# Patient Record
(demographics unavailable — no encounter records)

---

## 2025-05-22 NOTE — PLAN
[FreeTextEntry1] : Routine Gyn Exam: BSE taught Pap/HPV conducted Rx given for BR MRI--no hx of breast cancer--told can possibly do every 2 yrs. Rx TVS due to hx of BRCA 1 Rx DEXA -baseline done today and nl--Ca/Vit D 1000mcg, nutrition, 30 min weight bearing exercise discussed with patient Rx. Abd sono and explained limitation of sono for pancreas and best with MRCP or EUS but not clear studies about screening for pancreatic cancer.  Rx estradiol patch 0.05 twice a week for premature menopause due to BSO.  RTO in 1 year or PRN

## 2025-05-22 NOTE — HISTORY OF PRESENT ILLNESS
[FreeTextEntry1] :  JENNI VILCHIS, 43 year old female  BRCA 1 positive s/p b/l mastectomy and lap JESSE/BSO prophylactically  presents for annual visit.  Pt denies vaginal bleeding. She denies abn discharge or vaginitis sxs. No urinary complaints. She has normal BM, no bloody stool. She denies abdominal or pelvic pain.  Denies changes in medical status, medications, serious illness, hospitalizations, and surgeries.  PMHx: BRCA 1 pos  SHx: B/l mastectomy, lap JESSE w/ b/l salpingo-oophorectomy, breast implants  FMHx: Paternal cousin, sister- breast ca, BRCA  pos, sister BRCA pos and had a lumpectomy; Father(): Lung ca. Denies Fam hx of ovarian, uterine, and prostate ca.  All: NKDA  Med: HRT- estrogen patches weekly   Preventative Visit: Breast MRI (2024) : scarring w/ minimal low-level enhancement.   PAP Smear: NILM: HPV not detected., 2024 Abd Sono: no obvious pancreatic lesion., nl sono  PHQ: 0

## 2025-05-22 NOTE — PHYSICAL EXAM
[Appropriately responsive] : appropriately responsive [Alert] : alert [No Acute Distress] : no acute distress [No Lymphadenopathy] : no lymphadenopathy [Regular Rate Rhythm] : regular rate rhythm [No Murmurs] : no murmurs [Clear to Auscultation B/L] : clear to auscultation bilaterally [Soft] : soft [Non-tender] : non-tender [Non-distended] : non-distended [No HSM] : No HSM [No Lesions] : no lesions [No Mass] : no mass [Oriented x3] : oriented x3 [Examination Of The Breasts] : a normal appearance [No Masses] : no breast masses were palpable [Labia Majora] : normal [Labia Minora] : normal [Normal] : normal [Absent] : absent [Uterine Adnexae] : absent [FreeTextEntry6] : +implants [FreeTextEntry9] : Guaiac test negative, no masses noted